# Patient Record
Sex: FEMALE | URBAN - METROPOLITAN AREA
[De-identification: names, ages, dates, MRNs, and addresses within clinical notes are randomized per-mention and may not be internally consistent; named-entity substitution may affect disease eponyms.]

---

## 2017-06-26 ENCOUNTER — APPOINTMENT (OUTPATIENT)
Age: 37
Setting detail: DERMATOLOGY
End: 2017-06-26

## 2017-06-26 DIAGNOSIS — D22 MELANOCYTIC NEVI: ICD-10-CM

## 2017-06-26 DIAGNOSIS — L57.8 OTHER SKIN CHANGES DUE TO CHRONIC EXPOSURE TO NONIONIZING RADIATION: ICD-10-CM

## 2017-06-26 DIAGNOSIS — L71.8 OTHER ROSACEA: ICD-10-CM

## 2017-06-26 PROBLEM — D22.5 MELANOCYTIC NEVI OF TRUNK: Status: ACTIVE | Noted: 2017-06-26

## 2017-06-26 PROCEDURE — OTHER TREATMENT REGIMEN: OTHER

## 2017-06-26 PROCEDURE — OTHER REASSURANCE: OTHER

## 2017-06-26 PROCEDURE — 99214 OFFICE O/P EST MOD 30 MIN: CPT

## 2017-06-26 PROCEDURE — OTHER MIPS QUALITY: OTHER

## 2017-06-26 PROCEDURE — OTHER COUNSELING: OTHER

## 2017-06-26 PROCEDURE — OTHER PRESCRIPTION: OTHER

## 2017-06-26 RX ORDER — METRONIDAZOLE 10 MG/G
THIN COAT GEL TOPICAL QHS
Qty: 1 | Refills: 3 | Status: ERX

## 2017-06-26 ASSESSMENT — LOCATION ZONE DERM
LOCATION ZONE: NOSE
LOCATION ZONE: FACE
LOCATION ZONE: TRUNK

## 2017-06-26 ASSESSMENT — LOCATION SIMPLE DESCRIPTION DERM
LOCATION SIMPLE: CHEST
LOCATION SIMPLE: UPPER BACK
LOCATION SIMPLE: NOSE
LOCATION SIMPLE: RIGHT CHEEK
LOCATION SIMPLE: LEFT CHEEK

## 2017-06-26 ASSESSMENT — LOCATION DETAILED DESCRIPTION DERM
LOCATION DETAILED: LEFT CENTRAL MALAR CHEEK
LOCATION DETAILED: LEFT MEDIAL SUPERIOR CHEST
LOCATION DETAILED: RIGHT INFERIOR CENTRAL MALAR CHEEK
LOCATION DETAILED: SUPERIOR THORACIC SPINE
LOCATION DETAILED: NASAL DORSUM

## 2017-06-26 ASSESSMENT — SEVERITY ASSESSMENT OVERALL AMONG ALL PATIENTS: IN YOUR EXPERIENCE, AMONG ALL PATIENTS YOU HAVE SEEN WITH THIS CONDITION, HOW SEVERE IS THIS PATIENT'S CONDITION?: MILD

## 2017-06-26 NOTE — HPI: EVALUATION OF SKIN LESION(S)
Hpi Title: Evaluation of Skin Lesions
Additional History: Patient is here for her annual full body skin exam, no lesions that are changing or worrying her. She does spend some time in the sun but is very diligent with sunscreen use and sun protection. No previous history of pre-cancers or skin cancers

## 2017-06-26 NOTE — PROCEDURE: REASSURANCE
Include Location In Plan?: Yes
Detail Level: Generalized
Additional Note: Normal full body skin exam, no abnormal moles. Follow up annually
Additional Note: Normal full body skin exam, no evidence of any pre-cancers or skin cancers. Follow up annually

## 2017-06-26 NOTE — PROCEDURE: MIPS QUALITY
Detail Level: Detailed
Quality 111:Pneumonia Vaccination Status For Older Adults: Pneumococcal Vaccination not Administered or Previously Received, Reason not Otherwise Specified
Quality 47: Advance Care Plan: Advance Care Planning discussed and documented in the medical record; patient did not wish or was not able to name a surrogate decision maker or provide an advance care plan.
Quality 110: Preventive Care And Screening: Influenza Immunization: Influenza Immunization Ordered or Recommended, but not Administered due to system reason

## 2017-06-26 NOTE — PROCEDURE: TREATMENT REGIMEN
Discontinue Regimen: Hydroquinone as she has no evidence of hyperpigmentation
Detail Level: Simple
Continue Regimen: Metrogel 1% qhs, Sulfacetamide/Sulfur wash daily in AM
Plan: Currently the rosacea seems to be under good control, there is no clinical evidence of any inflammation, no papules, no pustules. I will refill her metronidazole to use once daily at night. Patient can call if the rosacea seems to be flaring more often so that I can change the topical medication to something different. Regarding the small blood vessel's on the face she can consider repeat laser therapy but she is not interested at this time
Otc Regimen: Cerave wash and lotion